# Patient Record
(demographics unavailable — no encounter records)

---

## 2025-03-18 NOTE — PLAN
[TextEntry] :   COUNSELING/EDUCATION Nutritional counseling: Increase vegetables and fruits Reviewed  5-2-1-0 Healthy Habits Questionnaire    CARE COORDINATION  reviewed  Immunizations reviewed           The following 15 to 21 year anticipatory guidance topics were discussed and/or handouts given: physical growth and development, social and academic competence, emotional well-being, risk reduction  and  injury prevention. Counseling for nutrition and physical activity was provided.   Information discussed with patient . I have examined the above-named student and completed the preparticipation physical evaluation. The patient does not present apparent clinical contraindications to practice and participate in sport(s) as outlined above. If conditions arise after the athlete has been cleared for participation, the physician may rescind the clearance until the problem is resolved and the potential consequences are completely explained to the athlete (and parents/guardians).       Follow up in one year.

## 2025-03-19 NOTE — HISTORY OF PRESENT ILLNESS
[Mother] : mother [FreeTextEntry7] : 18 YR C [FreeTextEntry1] : TONY  is here for 16 year  well child visit  Menstruation: LMP: seen gyn one week ago lost 5 pound thinks diabletes frequention urination, bm chronic abdominal pain fast heart rate refer dr. isela peacock Gi headache frontsal 2 weeks chronic dizziness off acarrien 2 large vente coffe cafferine exercis to start csoentyx hold bexero weaning meds by psyhciatris theratpist 12th grea disucsed phq9 acatnosinighralnstria Appetite: vegetarian   re carbohydrates, would like to see nutritionist Drugs: does not use electronic nicotine delivery system, not use tobacco, does not use drugs Safety: uses safety belts/safety equipment .  Patient is doing well at home. Past medical history reviewed. Immunizations up to date Oral: , routine dental visits Behavior/ Activity:  Safety: appropriately restrained in motor vehicle Sleeping: discussed Urination and bowel moments normal Current grade:  Parent(s) have current concerns or issues:, doing better than last year not in therapy has tried several therapist and has not found one she likes vegetarian diet would like to see nutritionist discussed multivitamin chronic Gi abdominal pain  lactose intolerant , has appt withDr. Vela in Jan/FEb  hidradenitis to have procedure followed by dermatology history Psychiatrist   new Dr. Braxton meds issues not in therapist seen several after dbdt  with stress in school depression, no plan not current suicidal ideation meds: fluoxetine 60 mg bupropion 300mg vitamin d  probitoics   [Yes] : Patient goes to dentist yearly [No] : Patient has not had sexual intercourse.

## 2025-05-09 NOTE — HISTORY OF PRESENT ILLNESS
[Meningococcal B] : Meningococcal B [FreeTextEntry1] : Pt in office for first Men B dose. Afebrile recently with no concerns.